# Patient Record
Sex: MALE | Race: WHITE | URBAN - METROPOLITAN AREA
[De-identification: names, ages, dates, MRNs, and addresses within clinical notes are randomized per-mention and may not be internally consistent; named-entity substitution may affect disease eponyms.]

---

## 2017-01-05 ENCOUNTER — HOSPITAL ENCOUNTER (OUTPATIENT)
Facility: CLINIC | Age: 62
Setting detail: OBSERVATION
Discharge: HOME | End: 2017-01-06
Attending: EMERGENCY MEDICINE | Admitting: INTERNAL MEDICINE
Payer: COMMERCIAL

## 2017-01-05 DIAGNOSIS — K92.2 GI BLEED: ICD-10-CM

## 2017-01-05 LAB
ABO + RH BLD: NORMAL
ABO + RH BLD: NORMAL
ANION GAP SERPL CALCULATED.3IONS-SCNC: 7 MMOL/L (ref 3–14)
BASOPHILS # BLD AUTO: 0 10E9/L (ref 0–0.2)
BASOPHILS NFR BLD AUTO: 0.2 %
BLD GP AB SCN SERPL QL: NORMAL
BLOOD BANK CMNT PATIENT-IMP: NORMAL
BUN SERPL-MCNC: 13 MG/DL (ref 7–30)
CALCIUM SERPL-MCNC: 8.1 MG/DL (ref 8.5–10.1)
CHLORIDE SERPL-SCNC: 107 MMOL/L (ref 94–109)
CO2 SERPL-SCNC: 28 MMOL/L (ref 20–32)
CREAT SERPL-MCNC: 0.97 MG/DL (ref 0.66–1.25)
DIFFERENTIAL METHOD BLD: ABNORMAL
EOSINOPHIL # BLD AUTO: 0.1 10E9/L (ref 0–0.7)
EOSINOPHIL NFR BLD AUTO: 1 %
ERYTHROCYTE [DISTWIDTH] IN BLOOD BY AUTOMATED COUNT: 13.1 % (ref 10–15)
GFR SERPL CREATININE-BSD FRML MDRD: 79 ML/MIN/1.7M2
GLUCOSE SERPL-MCNC: 95 MG/DL (ref 70–99)
HCT VFR BLD AUTO: 38.7 % (ref 40–53)
HGB BLD-MCNC: 10.3 G/DL (ref 13.3–17.7)
HGB BLD-MCNC: 10.8 G/DL (ref 13.3–17.7)
HGB BLD-MCNC: 12.2 G/DL (ref 13.3–17.7)
HGB BLD-MCNC: 13.2 G/DL (ref 13.3–17.7)
IMM GRANULOCYTES # BLD: 0 10E9/L (ref 0–0.4)
IMM GRANULOCYTES NFR BLD: 0.2 %
LYMPHOCYTES # BLD AUTO: 1.4 10E9/L (ref 0.8–5.3)
LYMPHOCYTES NFR BLD AUTO: 14.6 %
MCH RBC QN AUTO: 31 PG (ref 26.5–33)
MCHC RBC AUTO-ENTMCNC: 34.1 G/DL (ref 31.5–36.5)
MCV RBC AUTO: 91 FL (ref 78–100)
MONOCYTES # BLD AUTO: 0.6 10E9/L (ref 0–1.3)
MONOCYTES NFR BLD AUTO: 6.2 %
NEUTROPHILS # BLD AUTO: 7.4 10E9/L (ref 1.6–8.3)
NEUTROPHILS NFR BLD AUTO: 77.8 %
NRBC # BLD AUTO: 0 10*3/UL
NRBC BLD AUTO-RTO: 0 /100
PLATELET # BLD AUTO: 260 10E9/L (ref 150–450)
POTASSIUM SERPL-SCNC: 3.5 MMOL/L (ref 3.4–5.3)
RBC # BLD AUTO: 4.26 10E12/L (ref 4.4–5.9)
SODIUM SERPL-SCNC: 142 MMOL/L (ref 133–144)
SPECIMEN EXP DATE BLD: NORMAL
WBC # BLD AUTO: 9.5 10E9/L (ref 4–11)

## 2017-01-05 PROCEDURE — 85018 HEMOGLOBIN: CPT | Performed by: PHYSICIAN ASSISTANT

## 2017-01-05 PROCEDURE — 86900 BLOOD TYPING SEROLOGIC ABO: CPT | Performed by: EMERGENCY MEDICINE

## 2017-01-05 PROCEDURE — 36415 COLL VENOUS BLD VENIPUNCTURE: CPT | Performed by: PHYSICIAN ASSISTANT

## 2017-01-05 PROCEDURE — 80048 BASIC METABOLIC PNL TOTAL CA: CPT | Performed by: EMERGENCY MEDICINE

## 2017-01-05 PROCEDURE — G0378 HOSPITAL OBSERVATION PER HR: HCPCS

## 2017-01-05 PROCEDURE — 96361 HYDRATE IV INFUSION ADD-ON: CPT

## 2017-01-05 PROCEDURE — 25000128 H RX IP 250 OP 636: Performed by: PHYSICIAN ASSISTANT

## 2017-01-05 PROCEDURE — 86850 RBC ANTIBODY SCREEN: CPT | Performed by: EMERGENCY MEDICINE

## 2017-01-05 PROCEDURE — 86901 BLOOD TYPING SEROLOGIC RH(D): CPT | Performed by: EMERGENCY MEDICINE

## 2017-01-05 PROCEDURE — 99285 EMERGENCY DEPT VISIT HI MDM: CPT | Mod: 25

## 2017-01-05 PROCEDURE — 99220 ZZC INITIAL OBSERVATION CARE,LEVL III: CPT | Performed by: PHYSICIAN ASSISTANT

## 2017-01-05 PROCEDURE — 85025 COMPLETE CBC W/AUTO DIFF WBC: CPT | Performed by: EMERGENCY MEDICINE

## 2017-01-05 PROCEDURE — 96360 HYDRATION IV INFUSION INIT: CPT

## 2017-01-05 RX ORDER — PROCHLORPERAZINE MALEATE 5 MG
5-10 TABLET ORAL EVERY 6 HOURS PRN
Status: DISCONTINUED | OUTPATIENT
Start: 2017-01-05 | End: 2017-01-06 | Stop reason: HOSPADM

## 2017-01-05 RX ORDER — LIDOCAINE 40 MG/G
CREAM TOPICAL
Status: DISCONTINUED | OUTPATIENT
Start: 2017-01-05 | End: 2017-01-06 | Stop reason: HOSPADM

## 2017-01-05 RX ORDER — ONDANSETRON 2 MG/ML
4 INJECTION INTRAMUSCULAR; INTRAVENOUS EVERY 6 HOURS PRN
Status: DISCONTINUED | OUTPATIENT
Start: 2017-01-05 | End: 2017-01-06 | Stop reason: HOSPADM

## 2017-01-05 RX ORDER — NALOXONE HYDROCHLORIDE 0.4 MG/ML
.1-.4 INJECTION, SOLUTION INTRAMUSCULAR; INTRAVENOUS; SUBCUTANEOUS
Status: DISCONTINUED | OUTPATIENT
Start: 2017-01-05 | End: 2017-01-06 | Stop reason: HOSPADM

## 2017-01-05 RX ORDER — SODIUM CHLORIDE 9 MG/ML
INJECTION, SOLUTION INTRAVENOUS CONTINUOUS
Status: DISCONTINUED | OUTPATIENT
Start: 2017-01-05 | End: 2017-01-06 | Stop reason: HOSPADM

## 2017-01-05 RX ORDER — ACETAMINOPHEN 325 MG/1
650 TABLET ORAL EVERY 4 HOURS PRN
Status: DISCONTINUED | OUTPATIENT
Start: 2017-01-05 | End: 2017-01-06 | Stop reason: HOSPADM

## 2017-01-05 RX ORDER — ONDANSETRON 4 MG/1
4 TABLET, ORALLY DISINTEGRATING ORAL EVERY 6 HOURS PRN
Status: DISCONTINUED | OUTPATIENT
Start: 2017-01-05 | End: 2017-01-06 | Stop reason: HOSPADM

## 2017-01-05 RX ORDER — ACETAMINOPHEN 650 MG/1
650 SUPPOSITORY RECTAL EVERY 4 HOURS PRN
Status: DISCONTINUED | OUTPATIENT
Start: 2017-01-05 | End: 2017-01-06 | Stop reason: HOSPADM

## 2017-01-05 RX ORDER — PROCHLORPERAZINE 25 MG
25 SUPPOSITORY, RECTAL RECTAL EVERY 12 HOURS PRN
Status: DISCONTINUED | OUTPATIENT
Start: 2017-01-05 | End: 2017-01-06 | Stop reason: HOSPADM

## 2017-01-05 RX ORDER — LIDOCAINE 40 MG/G
CREAM TOPICAL
Status: DISCONTINUED | OUTPATIENT
Start: 2017-01-05 | End: 2017-01-05

## 2017-01-05 RX ADMIN — SODIUM CHLORIDE: 9 INJECTION, SOLUTION INTRAVENOUS at 21:12

## 2017-01-05 ASSESSMENT — ENCOUNTER SYMPTOMS
ANAL BLEEDING: 1
LIGHT-HEADEDNESS: 1
SHORTNESS OF BREATH: 0
DIZZINESS: 0
BLOOD IN STOOL: 1

## 2017-01-05 NOTE — PROGRESS NOTES
List all goals to be met before discharge home:    GI consult: Met    Hemoglobins stable: Partially met. Next hgb check will be at 1800. Pt had large bloody stool.

## 2017-01-05 NOTE — H&P
PRIMARY CARE PHYSICIAN:  [facility].      CHIEF COMPLAINT:  Rectal bleeding.      HISTORY OF PRESENT ILLNESS:  Thomas Islas is a 61-year-old male with past medical history significant for colon polyps and diverticulosis who presented to [facility] Emergency Department with complaints of rectal bleeding.  Approximately 10 days ago, the patient had 1 day of nausea and diarrhea.  This subsequently resolved on its own.  The patient continued to do well until approximately 3 days ago he developed rectal bleeding.  The patient notes that starting on 01/02 he began having episodes of loose watery stools with blood mixed in with them.  They have been increasing in frequency since 01/02.  Today, the patient has had approximately 3 episodes of loose watery stools with blood mixed in them.  Due to further concerns and some lightheadedness, he went into [facility] for further evaluation.  At [facility] patient underwent lab tests and was noted to have a hemoglobin of 11 with a heart rate of 120.  The patient was recommended to come in to [facility] for further evaluation.  In the Emergency Department, the patient was seen and evaluated by Dr. Davis.  Emergency department workup included a BMP with calcium of 8.1 and a CBC with hemoglobin of 13.2.  Heart rate on exam was stable at 72 beats per minute.  Due to persistence of rectal bleeding, GI was contacted from the Emergency Department and they are considering a possible scope.  Hospitalist Service was contacted for admission to observation.        I met with the patient in the Emergency Department.  The patient endorses the above history.  Had some lightheadedness this morning; however, this has since resolved.  The patient denies any cramping or abdominal pain with having bowel movements.  He has been eating and drinking without difficulties.  The patient does take an aspirin daily; however,  he has stopped taking this since 01/02 in light of the rectal bleeding.  The patient last had a colonoscopy in 2014 that  showed colon polyp; however, he underwent a polypectomy at that time.  He also notes that on his colonoscopy, he was noted to have diverticulosis.  The patient has never had rectal bleeding before.      REVIEW OF SYSTEMS:  Complete review of systems was performed and is negative other than as noted in HPI.      PAST MEDICAL HISTORY:  Significant for colon polyps, status post polypectomy and diverticulosis.      PRIOR TO ADMISSION MEDICATIONS:  Aspirin 81 mg by mouth daily.      ALLERGIES:  No known drug allergies.      PAST SURGICAL HISTORY:  None.      FAMILY HISTORY:  The patient's mother has history of breast cancer.  No family history of colon cancer.      SOCIAL HISTORY:  The patient reports rare alcohol use.  Denies tobacco abuse and illicit drug use.      PHYSICAL EXAMINATION:   VITAL SIGNS:  Temperature 98.1, heart rate 95, blood pressure 149/81, respiration rate 16, oxygen saturations 97% on room air.   GENERAL:  Well-appearing male.   HEENT:  Pupils equal and reactive to light.  Mucous membranes moist.   NECK:  No thyromegaly or lymphadenopathy.   CARDIOVASCULAR:  Regular rate and rhythm.  No murmurs.   RESPIRATORY:  Lungs clear to auscultation bilaterally.  No increased work of breathing.   ABDOMEN:  Soft, nontender, nondistended.  Normoactive bowel sounds.   SKIN:  Warm, dry.   EXTREMITIES:  Distal pulses intact in lower extremities bilaterally.  No pedal edema.   NEUROLOGIC:  Oriented x3.   PSYCHIATRIC:  Calm, cooperative.      LABORATORY DATA:  Reviewed in Epic.      ASSESSMENT AND PLAN:  Thomas Islas is a 69-year-old male with past medical history significant for colon polyps, status post polypectomy and diverticulosis who is being admitted to [facility] for rectal bleeding.     Gastrointestinal bleed, likely lower.  Last colonoscopy in 2014 demonstrated  diverticulosis and colon polyp. The patient has had approximately 4 days of rectal bleeding that has been increasing in frequency.  On exam in his primary care clinic, he was noted to have a hemoglobin of 11 with a heart rate of 120.  He was sent to [facility] for further evaluation.  In the Emergency Department, patient's hemoglobin 13.2.  Vital signs are stable.  We will admit patient to observation unit for further monitoring.    --  Continue to trend hemoglobins q.6 h.   --  Patient to be n.p.o. until evaluated by GI.   --  Will consult GI, input graciously appreciated.   --  IV fluids.   --  Repeat CBC tomorrow morning.   --  Conditional orders for blood transfusion if hemoglobin <7.0    Deep venous thrombosis prophylaxis:  Mechanical ambulation.      CODE STATUS:  Full code.      The patient was individually seen and examined by Dr. Beauchamp who agrees with the plan as outlined above.         JULIANNA BEAUCHAMP MD       As dictated by LASHAY PERDUE PA-C            D: 2017 11:48   T: 2017 12:17   MT: [id number]      Name:     SASHA DENNISON   MRN:      [id number]        Account:      [id number]   :      1955           Admitted:     [id number]      Document: [id number]       cc: [facility]

## 2017-01-05 NOTE — ED NOTES
"[facility]  ED Nurse Handoff Report    ED Chief complaint: Rectal Bleeding      ED Diagnosis:   Final diagnoses:   None       Code Status: Full Code    Allergies: No Known Allergies    Activity level:  Independent     Needed?: No    Isolation: No  Infection: Not Applicable    Bariatric?: No      Vital Signs:   Filed Vitals:    01/05/17 0923 01/05/17 0924 01/05/17 0926 01/05/17 1017   BP: 144/93 148/111  149/81   Pulse:  72  95   Temp:  97.6  F (36.4  C) 98.1  F (36.7  C)    TempSrc:  Oral Oral    Resp:  16  16   Height:  1.905 m (6' 3\")     Weight:  123.378 kg (272 lb)     SpO2: 98% 98%  97%       Cardiac Rhythm: ,        Pain level: 0-10 Pain Scale: 0    Is this patient confused?: No    Patient Report: Initial Complaint: Pt started experiencing blood in his stool 3 days ago. Went to clinic today and HgB was 11. Pt also started to feel lightheaded so came to ED.   Focused Assessment: Denies pain at this time.   Tests Performed: Labs  Abnormal Results: Hgb 13.2  Treatments provided: None    Family Comments: None    OBS brochure/video discussed/provided to patient: Yes    ED Medications:   Medications   lidocaine 1 % 1 mL (not administered)   lidocaine (LMX4) cream (not administered)   sodium chloride (PF) 0.9% PF flush 3 mL (not administered)   sodium chloride (PF) 0.9% PF flush 3 mL (not administered)       Drips infusing?:  No      ED NURSE PHONE NUMBER: [phone]           "

## 2017-01-05 NOTE — PHARMACY-ADMISSION MEDICATION HISTORY
Admission medication history interview status for the 1/5/2017  admission is complete. See EPIC admission navigator for prior to admission medications     Medication history source reliability:Good    Actions taken by pharmacist (provider contacted, etc): Interviewed patient.      Additional medication history information not noted on PTA med list :None    Medication reconciliation/reorder completed by provider prior to medication history? No    Time spent in this activity: 5 minutes    Prior to Admission medications    Medication Sig Last Dose Taking? Auth Provider   ASPIRIN PO Take 81 mg by mouth daily 1/2/2017 at am Yes Unknown, Entered By History       Jazz Hummel, FideliaD, BCPS

## 2017-01-05 NOTE — ED PROVIDER NOTES
History     Chief Complaint:  Rectal Bleeding      HPI   Thomas Islas is an otherwise healthy 61 year old male who presents to the emergency department today for evaluation of rectal bleeding and blood in the stool. The patient states that about 10 days ago, he began experiencing diarrhea and then had the stomach flu for about 3 days. However, the patient states that 6 days ago, he did not have a bowel movement so he took a Miralax which helped. The patient states that 3 days ago, he noticed blood in his stool and in the toilet. The patient states that he felt lightheaded this morning for the first time, prompting him to go to his family physician at [facility], where he underwent lab tests and was found to have a normal White blood count but a hemoglobin that dropped to 11.9 alongside a pulse of 120. The patient denies shortness of breath and dizziness. The patient denies a history of abdominal surgeries.     Of note, the patient states that he underwent a colonoscopy in August of 2014 where a polyp was found and was told to return for a repeat colonoscopy in 5 years.       Allergies:  The patient has no known allergies to medications.      Medications:    The patient is not currently taking any prescribed medications.     Past Medical History:    The patient does not have any pertinent past medical history.     Past Surgical History:    The patient has no pertinent surgical history.     Family History:  The patient has no pertinent family history.    Social History:  Marital Status:    PCP: [facility]      Review of Systems   Respiratory: Negative for shortness of breath.    Gastrointestinal: Positive for blood in stool and anal bleeding.   Neurological: Positive for light-headedness. Negative for dizziness.   All other systems reviewed and are negative.    Physical Exam   Vitals:  Patient Vitals for the past 24 hrs:   BP Temp Temp src Pulse Resp SpO2 Height  "Weight   01/05/17 1150 149/81 mmHg - - - - 100 % - -   01/05/17 1147 - - - - - 97 % - -   01/05/17 1017 149/81 mmHg - - 95 16 97 % - -   01/05/17 0926 - 98.1  F (36.7  C) Oral - - - - -   01/05/17 0924 (!) 148/111 mmHg 97.6  F (36.4  C) Oral 72 16 98 % 1.905 m (6' 3\") 123.378 kg (272 lb)   01/05/17 0923 (!) 144/93 mmHg - - - - 98 % - -     Physical Exam  Constitutional: The patient is oriented to person, place, and time.   HENT:   Head: Atraumatic  Right Ear: Normal  Left Ear: Normal  Nose: Nose normal.   Mouth/Throat: Oropharynx is clear and moist. No erythema or exudate.   Eyes: Conjunctivae and EOM are normal. Pupils are equal, round, and reactive to light. No discharge  Neck: Normal range of motion. Neck supple.   Cardiovascular: Normal rate, regular rhythm, no murmur gallops or rubs. Intact distal pulses.    Pulmonary/Chest: CTA bilaterally. No wheezes rale or rhonchi.  Abdominal: Soft. Non tender.  No masses   Musculoskeletal: No edema. No bony deformity. Normal range of motion  Lymphadenopathy: The patient has no cervical adenopathy.   Neurological: The patient is alert and oriented to person, place, and time. The patient has normal strength and normal reflexes. No cranial nerve deficit. Coordination normal.   Skin: Skin is warm and dry. No rash noted. The patient is not diaphoretic.   Psychiatric: The patient has a normal mood and affect.     Emergency Department Course     Laboratory:  Laboratory findings were communicated with the patient who voiced understanding of the findings.    CBC: RBC 4.26 (L), HGB 13.2 (L), HCT 38.7 (L) o/w WNL. (WBC 9.5, )   CMP: Calcium 8.1 (L) o/w WNL (Creatinine: 0.97)  ABO/Rh screen and type: O positive, negative for antibody.        Emergency Department Course:  Nursing notes and vitals reviewed.  I performed an exam of the patient as documented above.   IV was inserted and blood was drawn for laboratory testing, results above.  The patient was rechecked and was " updated on the results of his laboratory studies.   1052: I spoke with Dr. Iraheta of the GI service from [facility] regarding patient's presentation, findings, and plan of care.  1113: I spoke with Dr. Beauchamp of the Hospitalist service from [facility] regarding patient's presentation, findings, and plan of care.  I discussed the treatment plan with the patient. They expressed understanding of this plan and consented to admission. I discussed the patient with Dr. Beauchamp, who will admit the patient to a monitored bed for further evaluation and treatment.  I personally reviewed the laboratory results with the Patient and answered all related questions prior to admission.    Impression & Plan      Medical Decision Making:  Thomas Islas is a 61 year old male who presents with an approximate 1 week history of persistent bright red blood and dark stool from the rectum. Hemoglobin here is 13.2, but per office note from earlier today, it was 11.9 and he was quite tachycardic there. He is borderline tachycardic here. Given the persistent bleeding and significant drop in his hemoglobin from baseline of about 16, I do feel that observation admission is warranted for urgent endoscopy. I spoke with Dr. Beauchamp of the hospitalist service and [facility] of GI. The patient will be admitted for further evaluation and treatment.       Diagnosis:    ICD-10-CM    1. GI bleed K92.2        Disposition:   The patient is admitted to the hospital.       Scribe Disclosure:  Allan MORSE, am serving as a scribe at 9:01 AM on 1/5/2017 to document services personally performed by Sean Davis MD, based on my observations and the provider's statements to me.    1/5/2017    EMERGENCY DEPARTMENT        Sean Davis MD  01/05/17 3088

## 2017-01-05 NOTE — PROGRESS NOTES
List all goals to be met before discharge home:    GI consult: Not met. Still pending.    Hemoglobins stable: Not met.

## 2017-01-05 NOTE — ED AVS SNAPSHOT
MRN:[id number]                      After Visit Summary   1/5/2017    Thomas Islas    MRN: [id number]           Thank you!     Thank you for choosing [facility] for your care. Our goal is always to provide you with excellent care. Hearing back from our patients is one way we can continue to improve our services. Please take a few minutes to complete the written survey that you may receive in the mail after you visit with us. Thank you!        Patient Information     Date Of Birth          1955        About your hospital stay     You were [facility] on:  January 5, 2017 [facility]    You were discharged on:  January 6, 2017        Reason for your hospital stay       Lower gastrointestinal bleeding.                  Who to Call     For medical emergencies, please call 911.  For non-urgent questions about your medical care, please call your primary care provider or clinic, None          Attending Provider     Provider    Sean Davis MD Bhattarai, Nimesh, MD       Primary Care Provider Fax #    [facility] [phone] [address] [place] [address]        After Care Instructions     Activity       Your activity upon discharge: activity as tolerated            Diet       Follow this diet upon discharge: Regular Diet Adult                  Follow-up Appointments     Follow-up and recommended labs and tests        Follow up with primary care provider, [facility], within 7 days for hospital follow- up of lower gi bleed. Repeat cbc.    Contact information if needed for:  [facility] [phone]  Dr. Glenda Hood, physician assistant-certified                  Further instructions from your care team       Follow up with [facility] within 1 week to recheck hemoglobin. Discharge hgb 10.1 (10.1-10.8).    [facility]  "[phone]  Physician: Glenda Iraheta MD  Physician Assistant: Dev Hood    Return to ED if persistent or significant bleeding, fainting, chest pain, abdominal pain with bleeding, other new concerning or worsening symptoms.    Pending Results     No orders found for last 2 day(s).            Statement of Approval     Ordered          17 1448  I have reviewed and agree with all the recommendations and orders detailed in this document.   EFFECTIVE NOW     Approved and electronically signed by:  Barthell, Joanna Kersten Ulmen, PA-C             Admission Information        Provider Department Dept Phone    2017 Junior Beauchamp MD [facility] [phone]      Your Vitals Were     Blood Pressure Pulse Temperature    137/78 mmHg 91 98.5  F (36.9  C) (Oral)    Respirations Height Weight    18 1.905 m (6' 3\") 123.378 kg (272 lb)    BMI (Body Mass Index) Pulse Oximetry       34.00 kg/m2 97%       MyChart Information     [employer] lets you send messages to your doctor, view your test results, renew your prescriptions, schedule appointments and more. To sign up, go to [email or web address] . Click on \"Log in\" on the left side of the screen, which will take you to the Welcome page. Then click on \"Sign up Now\" on the right side of the page.     You will be asked to enter the access code listed below, as well as some personal information. Please follow the directions to create your username and password.     Your access code is: [email or web address]  Expires: 2017  2:41 PM     Your access code will  in 90 days. If you need help or a new code, please call your [facility] clinic or [phone].        Care EveryWhere ID     This is your Care EveryWhere ID. This could be used by other organizations to access your [facility] medical records  [id number]           Review of your medicines      CONTINUE these medicines which have NOT CHANGED        Dose / Directions    ASPIRIN PO        Dose:  81 mg   Take 81 mg " by mouth daily   Refills:  0                Protect others around you: Learn how to safely use, store and throw away your medicines at www.disposemymeds.org.             Medication List: This is a list of all your medications and when to take them. Check marks below indicate your daily home schedule. Keep this list as a reference.      Medications           Morning Afternoon Evening Bedtime As Needed    ASPIRIN PO   Take 81 mg by mouth daily

## 2017-01-05 NOTE — CONSULTS
GASTROENTEROLOGY CONSULTATION     Thomas Islas   [address] [facility] [address]   61 year old male   Admission Date/Time: 1/5/2017   Encounter Date: 1/5/2017  Primary Care Provider: [facility]     Referring / Attending Physician: Anne Marie Sullivan PA-C   We were asked to see the patient in consultation by Anne Marie Sullivan PA-C for evaluation of rectal bleeding.     HPI: Thomas Islas is a 61 year old male without a significant past medical history who presented to the ED today with rectal bleeding. The patient states that he and his wife both had a stomach bug where they had nausea, vomiting and diarrhea that lasted about two days. He was feeling well after this but four days ago he noticed bright red blood mixed in with stool. He denies any abdominal pain, nausea, vomiting or rectal pain. Over the last several days he continued to pass frequent bright red blood with minimal stool output, just blood.  He went to his PCPs this morning because he had some light headedness. At his PCPs his hgb was 11 and his heart rate was noted to be 120. He was referred to the ED.  In the ED he was found to have a hgb of 13.2. Heart rate here was stable at 72. He continued to pass blood so he was admitted for further observation. He denies any family history of IBD or GI malignancy.  His last colonoscopy was performed in October 2015 and a 2 mm polyp was removed which was a tubular adenoma. Diverticulosis was also seen in the sigmoid colon.     Past Medical History  No past medical history on file.    Past Surgical History  No past surgical history on file.    Family History  Negative for IBD or GI malignancy    Social History  Social History     Social History     Marital Status:      Spouse Name: N/A     Number of Children: N/A     Years of Education: N/A     Occupational History     Not on file.     Social History Main Topics     Smoking status: Not on file     Smokeless tobacco:  "Not on file     Alcohol Use: Not on file     Drug Use: Not on file     Sexual Activity: Not on file     Other Topics Concern     Not on file     Social History Narrative     No narrative on file       Medications  Prior to Admission medications    Medication Sig Start Date End Date Taking? Authorizing Provider   ASPIRIN PO Take 81 mg by mouth daily   Yes Unknown, Entered By History       Allergies:  Review of patient's allergies indicates no known allergies.    ROS: A ten point review of systems was obtained and negative other than the symptoms noted above in the HPI.     Physical Exam:   /86 mmHg  Pulse 88  Temp(Src) 97.7  F (36.5  C) (Oral)  Resp 16  Ht 1.905 m (6' 3\")  Wt 123.378 kg (272 lb)  BMI 34.00 kg/m2  SpO2 99%   Constitutional: healthy, alert, no acute distress  Cardiovascular: regular rate and rhythm, no murmurs,rubs or gallops  Respiratory: clear to auscultation bilaterally  Psychiatric: normal pleasant affect  Head: atraumatic, normocephalic  Neck: supple, no thyromegaly  ENT: mucous membranes are moist, no oral lesions are noted  Abdomen: soft, non-tender, non-distended, normally active bowel sound. No masses or hepatosplenomegaly is appreciated. No rebound tenderness or guarding  Neuro: Neurologically intact grossly  Skin: warm, dry, no rashes are noted    ADDITIONAL COMMENTS:   I reviewed the patient's new clinical lab test results.   Recent Labs   Lab Test  01/05/17   1216  01/05/17   0920   WBC   --   9.5   HGB  12.2*  13.2*   MCV   --   91   PLT   --   260      Recent Labs   Lab Test  01/05/17   0920   NA  142   POTASSIUM  3.5   CHLORIDE  107   CO2  28   BUN  13   CR  0.97   ANIONGAP  7   CONNIE  8.1*      No lab results found.   I reviewed the patient's new imaging results.     Assessment:  61 year old male with painless hematochezia for the last four days. He did have what sounds to be a viral GI infection last week but his current bleeding does not sound infectious. This would be an " "atypical presentation for inflammatory bowel disease or ischemic colitis. His symptoms sound most consistent with a diverticular bleed which typically resolve on their own.     Plan:   -Ok to restart diet  -Follow stool output and check for enteric pathogens  -If he starts to bleed profusely would suggest tagged RBC and possible angiography if it is positive  -Would not repeat colonoscopy at this time given the low likelihood of finding a diverticular bleed during that procedure  -Continue supportive care  -GI following    I discussed the patient's findings and plan with Dr. Glenda Iraheta who will also independently see and examine the patient.     Dev Hood PA-C  [facility]  Cell:  [phone] Monday through Friday [phone]  Office: [phone]    Staff addendum: 61 yom with recent viral gastroenteritis with hematochezia since Monday. No abd pain. Last bloody bowel movement a few minutes ago    /86 mmHg  Pulse 88  Temp(Src) 97.7  F (36.5  C) (Oral)  Resp 16  Ht 1.905 m (6' 3\")  Wt 123.378 kg (272 lb)  BMI 34.00 kg/m2  SpO2 99%  Gen: awake alert NAD  Abd: S NT ND +bs    A/P: 61 yom with LGIB likely either due to ischemia or diverticulosis, anticipate bleeding will stop without intervention.  -Follow hgb  -Regular diet ok  -Assess in AM    Glenda Iraheta MD  [facility]  Pager [phone]  Office [phone]    "

## 2017-01-05 NOTE — ED AVS SNAPSHOT
[facility] Observation Unit    [address] [facility] [address] [address]    Phone:  [phone]                                       Thomas Islas   MRN: [id number]    Department:  [facility]   Date of Visit:  1/5/2017           After Visit Summary Signature Page     I have received my discharge instructions, and my questions have been answered. I have discussed any challenges I see with this plan with the nurse or doctor.    ..........................................................................................................................................  Patient/Patient Representative Signature      ..........................................................................................................................................  Patient Representative Print Name and Relationship to Patient    ..................................................               ................................................  Date                                            Time    ..........................................................................................................................................  Reviewed by Signature/Title    ...................................................              ..............................................  Date                                                            Time

## 2017-01-06 VITALS
TEMPERATURE: 98.5 F | OXYGEN SATURATION: 97 % | HEIGHT: 75 IN | WEIGHT: 272 LBS | BODY MASS INDEX: 33.82 KG/M2 | HEART RATE: 91 BPM | DIASTOLIC BLOOD PRESSURE: 78 MMHG | SYSTOLIC BLOOD PRESSURE: 137 MMHG | RESPIRATION RATE: 18 BRPM

## 2017-01-06 LAB
ERYTHROCYTE [DISTWIDTH] IN BLOOD BY AUTOMATED COUNT: 13.4 % (ref 10–15)
HCT VFR BLD AUTO: 31.2 % (ref 40–53)
HGB BLD-MCNC: 10.1 G/DL (ref 13.3–17.7)
HGB BLD-MCNC: 10.5 G/DL (ref 13.3–17.7)
MCH RBC QN AUTO: 30.9 PG (ref 26.5–33)
MCHC RBC AUTO-ENTMCNC: 33.7 G/DL (ref 31.5–36.5)
MCV RBC AUTO: 92 FL (ref 78–100)
PLATELET # BLD AUTO: 197 10E9/L (ref 150–450)
RBC # BLD AUTO: 3.4 10E12/L (ref 4.4–5.9)
WBC # BLD AUTO: 9.5 10E9/L (ref 4–11)

## 2017-01-06 PROCEDURE — 96361 HYDRATE IV INFUSION ADD-ON: CPT

## 2017-01-06 PROCEDURE — 36415 COLL VENOUS BLD VENIPUNCTURE: CPT | Performed by: PHYSICIAN ASSISTANT

## 2017-01-06 PROCEDURE — 25000128 H RX IP 250 OP 636: Performed by: PHYSICIAN ASSISTANT

## 2017-01-06 PROCEDURE — G0378 HOSPITAL OBSERVATION PER HR: HCPCS

## 2017-01-06 PROCEDURE — 99217 ZZC OBSERVATION CARE DISCHARGE: CPT | Performed by: PHYSICIAN ASSISTANT

## 2017-01-06 PROCEDURE — 85027 COMPLETE CBC AUTOMATED: CPT | Performed by: PHYSICIAN ASSISTANT

## 2017-01-06 PROCEDURE — 85018 HEMOGLOBIN: CPT | Performed by: PHYSICIAN ASSISTANT

## 2017-01-06 RX ADMIN — SODIUM CHLORIDE: 9 INJECTION, SOLUTION INTRAVENOUS at 04:24

## 2017-01-06 NOTE — PROGRESS NOTES
List all goals to be met before discharge home:    GI consult: Met, will reassess in AM if condition declines.    Hemoglobins stable: Partially met. Last hg check showed only slight decrease from 10.8 to 10.3. Next check scheduled for 0400, has not yet been drawn at this time.    Nursing to notify MD when goals have been met in preparation for discharge.

## 2017-01-06 NOTE — PROVIDER NOTIFICATION
Per PA, diet changed from regular to NPO. Fluids increased to 125 cc/hour. Nursing to notify MD if SBP<100 or HR>120. Next hg changed to 2200. Writer paging GI to notify.

## 2017-01-06 NOTE — PROGRESS NOTES
List all goals to be met before discharge home:    GI consult: Met, will reassess in AM if condition declines.    Hemoglobins stable: Met    Nursing to notify MD when goals have been met in preparation for discharge.

## 2017-01-06 NOTE — PROGRESS NOTES
List all goals to be met before discharge home:    GI consult: Met    Hemoglobins stable: Not met, hg @ 1800 10.8. Next draw changed to 2200 d/t mild tachycardia/hypotension. Less frequent stool noted, only one episode since 1500. Voided @ 2000 without diarrhea present.    Nursing to notify MD when goals have been met in preparation for discharge.

## 2017-01-06 NOTE — PROVIDER NOTIFICATION
Elevated HR , 110-116 noted with mild hypotension of 100/66. Last hg 10.8, down from 12.8. Only one stool noted since 1500.

## 2017-01-06 NOTE — PROGRESS NOTES
"GASTROENTEROLOGY PROGRESS NOTE     SUBJECTIVE: Feeling well. Denies any pain, nausea or vomiting. Had been tolerating diet yesterday. No symptoms overnight but was tachycardic.      OBJECTIVE:   /78 mmHg  Pulse 91  Temp(Src) 98.5  F (36.9  C) (Oral)  Resp 18  Ht 1.905 m (6' 3\")  Wt 123.378 kg (272 lb)  BMI 34.00 kg/m2  SpO2 97%   Temp (24hrs), Av.3  F (36.8  C), Min:97.7  F (36.5  C), Max:98.7  F (37.1  C)     Patient Vitals for the past 72 hrs:   Weight   17 0924 123.378 kg (272 lb)      Intake/Output Summary (Last 24 hours) at 17 1101  Last data filed at 17 0700   Gross per 24 hour   Intake    560 ml   Output      0 ml   Net    560 ml      PHYSICAL EXAM   Constitutional: Healthy, in bed, no acute distress    Additional Comments:   ROS, FH, SH: See initial GI consult for details.     I have reviewed the patient's new clinical lab results:   Recent Labs   Lab Test  17   0440  17   2155  17   1822   17   0920   WBC  9.5   --    --    --   9.5   HGB  10.5*  10.3*  10.8*   < >  13.2*   MCV  92   --    --    --   91   PLT  197   --    --    --   260    < > = values in this interval not displayed.      Recent Labs   Lab Test  17   0920   NA  142   POTASSIUM  3.5   CHLORIDE  107   CO2  28   BUN  13   CR  0.97   ANIONGAP  7   CONNIE  8.1*      No lab results found.     Assessment:  61 year old male with painless hematochezia. Hgb has dropped about two grams but appears to be stable. No signs of bleeding overnight. Symptoms would be most consistent with a diverticular bleed. Colonoscopy done about two years ago showed one small polyp and diverticulosis.     Plan:    -Diet as tolerated  -Follow stool output  -Recheck hgb this afternoon but would expect it to remain stable  -If hgb stable and no further bleeding he would be ok for discharge from a GI standpoint  -If bleeding returns would suggested tagged RBC scan and possible angiography.   -No further GI " recommendations at this time. Will sign off. Please call with any questions.    Dev Hood PA-C  [facility]  Cell:  [phone] Monday through Friday 7437-0622  Office: [phone]

## 2017-01-06 NOTE — PROGRESS NOTES
[facility]    Hospitalist Progress Note    Date of Service (when I saw the patient): 01/06/2017    Assessment and Plan  Thomas Islas is a 69-year-old male with past medical history significant for colon polyps, status post polypectomy and diverticulosis who is being admitted to [facility] observation unit for rectal bleeding 1/5/2017.     Gastrointestinal bleed, likely lower.  Last colonoscopy in 2014 demonstrated diverticulosis and colon polyp. Presented with approximately 4 days of rectal bleeding and loose stools that has been increasing in frequency.  Initially seen in his primary care clinic with hemoglobin of 11 with a heart rate of 120.  He was sent to [facility] for further evaluation.  In the Emergency Department, patient's hemoglobin 13.2.  Vital signs are stable.  - HGB 13.2-->12.2-->10.8-->10.3-->10.5  - Last bm >12 hr yesterday with a small amount of blood. Developed tachycardia 110s and borderline hypotension shortly after and was made NPO with increased IVF.  - Vitals have stabilized and without further bm or bleeding.  - Consul by GI appreciated; likely diverticular bleed. Consider tagged RBC scan and angiography if bleeding returns.  - Diet advanced  - Recheck hgb this afternoon    Deep venous thrombosis prophylaxis:  Mechanical ambulation.        CODE STATUS:  Full code.      Disposition: Expected discharge this afternoon if diet tolerated, hgb stable, and no significant bleeding.    This patient was discussed with Dr. Monaco of the Hospitalist Service who agrees with current plans as outlined above.    JoAnna K. Barthell, VIDYA    Interval History  No bm or bleeding x 13 hr (1730). Mild tachycardia 110s and  since small amount of bleeding with last bm. No abd pain or nausea. No lightheadedness, cp, difficulty breathing.    -Data reviewed today: I reviewed all new labs and imaging results over the last 24 hours. I personally reviewed no images or EKG's  today.    Physical Exam  Temp: 98.5  F (36.9  C) Temp src: Oral BP: 137/78 mmHg Pulse: 91   Resp: 18 SpO2: 97 % O2 Device: None (Room air)    Filed Vitals:    01/05/17 0924   Weight: 123.378 kg (272 lb)     Vital Signs with Ranges  Temp:  [97.7  F (36.5  C)-98.7  F (37.1  C)] 98.5  F (36.9  C)  Pulse:  [] 91  Resp:  [16-18] 18  BP: (100-149)/(58-86) 137/78 mmHg  SpO2:  [97 %-100 %] 97 %  I/O last 3 completed shifts:  In: 560 [P.O.:560]  Out: -     Constitutional: Alert and oriented x 3. Appears stated age, no acute distress.  Respiratory: Breath sounds CTA. No wheezing, crackles, or rhonchi.  Cardiovascular: RRR, no rub or murmur. No peripheral edema. Dorsalis pedis pulses detected and symmetric.  GI: Soft, non-tender, non-distended. Bowel sounds present.  Skin/Integumen: Warm, dry, no rashes or lesions.    Medications    NaCl 75 mL/hr at 01/06/17 0827       sodium chloride (PF)  3 mL Intracatheter Q8H       Data    Recent Labs  Lab 01/06/17  0440 01/05/17  2155 01/05/17  1822  01/05/17  0920   WBC 9.5  --   --   --  9.5   HGB 10.5* 10.3* 10.8*  < > 13.2*   MCV 92  --   --   --  91     --   --   --  260   NA  --   --   --   --  142   POTASSIUM  --   --   --   --  3.5   CHLORIDE  --   --   --   --  107   CO2  --   --   --   --  28   BUN  --   --   --   --  13   CR  --   --   --   --  0.97   ANIONGAP  --   --   --   --  7   CONNIE  --   --   --   --  8.1*   GLC  --   --   --   --  95   < > = values in this interval not displayed.    Imaging:  No results found for this or any previous visit (from the past 24 hour(s)).

## 2017-01-06 NOTE — PLAN OF CARE
Problem: Goal Outcome Summary  Goal: Goal Outcome Summary  Outcome: Improving  VSS. Waiting for results of 1400 hgb recheck. Tolerated regular diet for lunch. Hopeful for DC home this evening. Up independently. Has has no bloody stools since yesterday at 1730.

## 2017-01-06 NOTE — DISCHARGE SUMMARY
[facility]    Discharge Summary  Hospitalist    Date of Admission:  1/5/2017  Date of Discharge:  1/6/2017  Discharging Provider: JoAnna K. Barthell, PA-C  Date of Service (when I saw the patient): 01/06/2017    Discharge Diagnoses  Likely lower GI bleed thought to be secondary to diverticular bleed    History of Present Illness  Thomas Islas is a 69-year-old male with past medical history significant for colon polyps, status post polypectomy and diverticulosis who is being admitted to [facility] observation unit for rectal bleeding 1/5/2017.     Hospital Course  Thomas Islas was admitted on 1/5/2017.  The following problems were addressed during his hospitalization:    Gastrointestinal bleed, likely lower.  Last colonoscopy in 2014 demonstrated diverticulosis and colon polyp. Presented with approximately 4 days of rectal bleeding and loose stools that has been increasing in frequency.  Initially seen in his primary care clinic with hemoglobin of 11 and heart rate of 120.  He was sent to [facility] for further evaluation.  In the Emergency Department, patient's hemoglobin 13.2 and vital signs were stable.  - HGB 13.2-->12.2-->10.8-->10.3-->10.5-->10.1  - Last bm >20 hours ago and with only a small amount of blood. Shortly after this episode developed tachycardia 110s and borderline hypotension. Subsequently made NPO with increased IVF. Vitals improved and have remained stable throughout stay. No further episodes of bleeding. Tolerating PO. Final hgb 10.1 after 10.5 earlier on day of discharge and suspect some dilutional component related to ongoing maintenance fluids as again no further episodes of visible blood.  - [facility] consult obtained and felt symptoms likely due to diverticular bleed. No further intervention necessary during observation stay.  Follow up:  - [facility]icians, [facility] within 1 week to recheck cbc    This patient was discussed with Dr. Monaco of the  Hospitalist Service who agrees with current plans as outlined above    JoAnna K. Barthell, PA-C    Significant Results and Procedures  As above and below.    Pending Results  Unresulted Labs Ordered in the Past 30 Days of this Admission     No orders found for last 60 day(s).          Code Status  Full Code       Primary Care Physician  [facility]    Physical Exam  Temp: 98.5  F (36.9  C) Temp src: Oral BP: 137/78 mmHg Pulse: 91   Resp: 18 SpO2: 97 % O2 Device: None (Room air)    Filed Vitals:    01/05/17 0924   Weight: 123.378 kg (272 lb)     Vital Signs with Ranges  Temp:  [98.1  F (36.7  C)-98.7  F (37.1  C)] 98.5  F (36.9  C)  Pulse:  [] 91  Resp:  [16-18] 18  BP: (100-137)/(58-78) 137/78 mmHg  SpO2:  [97 %-98 %] 97 %  I/O last 3 completed shifts:  In: 560 [P.O.:560]  Out: -     Constitutional: Appears stated age, no acute distress.  Respiratory: Breath sounds CTA. No wheezing, crackles, or rhonchi.  Cardiovascular: RRR, no rub or murmur. No peripheral edema. Dorsalis pedis pulses detected and symmetric.  GI: Soft, non-tender, non-distended. Bowel sounds present.  Skin/Integumen: Warm, dry, no rashes or lesions.    Discharge Disposition  Discharged to home  Condition at discharge: Stable    Consultations This Hospital Stay  GASTROENTEROLOGY IP CONSULT    Time Spent on This Encounter  I, JoAnna K. Barthell, personally saw the patient today and spent less than or equal to 30 minutes discharging this patient.    Discharge Orders    Reason for your hospital stay   Lower gastrointestinal bleeding.     Activity   Your activity upon discharge: activity as tolerated     Follow-up and recommended labs and tests    Follow up with primary care provider, [facility], within 7 days for hospital follow- up of lower gi bleed. Repeat cbc.    Contact information if needed for:  [facility] [phone]  Dr. Glenda Hood, physician assistant-certified      Full Code     Diet   Follow this diet upon discharge: Regular Diet Adult       Discharge Medications  Current Discharge Medication List      CONTINUE these medications which have NOT CHANGED    Details   ASPIRIN PO Take 81 mg by mouth daily           Allergies  No Known Allergies  Data  Most Recent 3 CBC's:  Recent Labs   Lab Test  01/06/17   1407  01/06/17   0440  01/05/17   2155   01/05/17   0920   WBC   --   9.5   --    --   9.5   HGB  10.1*  10.5*  10.3*   < >  13.2*   MCV   --   92   --    --   91   PLT   --   197   --    --   260    < > = values in this interval not displayed.      Most Recent 3 BMP's:  Recent Labs   Lab Test  01/05/17   0920   NA  142   POTASSIUM  3.5   CHLORIDE  107   CO2  28   BUN  13   CR  0.97   ANIONGAP  7   CONNIE  8.1*   GLC  95     Most Recent 2 LFT's:No lab results found.  Most Recent INR's and Anticoagulation Dosing History:        Anticoagulation Dose History     There is no flowsheet data to display.        Most Recent 3 Troponin's:No lab results found.  Most Recent Cholesterol Panel:No lab results found.  Most Recent 6 Bacteria Isolates From Any Culture (See EPIC Reports for Culture Details):No lab results found.  Most Recent TSH, T4 and A1c Labs:No lab results found.  No results found for this or any previous visit.

## 2017-01-06 NOTE — DISCHARGE INSTRUCTIONS
Follow up with [facility] within 1 week to recheck hemoglobin. Discharge hgb 10.1 (10.1-10.8).    [facility] [phone]  Physician: Gledna Iraheta MD  Physician Assistant: Dev Hood    Return to ED if persistent or significant bleeding, fainting, chest pain, abdominal pain with bleeding, other new concerning or worsening symptoms.

## 2017-01-06 NOTE — PLAN OF CARE
Problem: Goal Outcome Summary  Goal: Goal Outcome Summary  Outcome: No Change  RN: Alert and oriented X 3, up Ind without assistive device. One episode of loose stool noted X 1 with clots, dried blood. Hg decreased from 12.2 to 10.8 @ 1800 with tachycardia and mild hypotension noted. Fluids increased, pt made NPO for possible scope in AM. VS then improved; HR 's with 's-120's. Last hg check @ 2200; slight decrease to 10.3. No episodes of loose stool/melena since 1730. Pt denies any associated dizziness or lightheadedness. Previously tolerating regular diet without nausea and voiding adequately. GI notified of episode, to reassess in AM if sx's persist. Pt otherwise stable and improved at this time.

## 2017-01-06 NOTE — PLAN OF CARE
Problem: Goal Outcome Summary  Goal: Goal Outcome Summary  Outcome: No Change  RN: Alert and oriented X 3, up Ind without assistive device. Last hg check improved from 10.3 to 10.5. Last stool yesterday evening @ 1730. Denies pain, abd cramping. BS present and normoactive. VS improved, BP stable in 110-130's, HR in high 80's-low 90's overnight. Pt NPO d/t decline in condition yesterday evening, but previously tolerating regular diet well without nausea. Anticipate discharge today if condition continues to improve.

## 2017-01-06 NOTE — PROGRESS NOTES
List all goals to be met before discharge home:    GI consult: Met, will reassess in AM if condition declines.    Hemoglobins stable: Partially met. Last hg check showed only slight decrease from 10.8 to 10.3. Next check scheduled for 0400.    Nursing to notify MD when goals have been met in preparation for discharge.

## 2017-01-06 NOTE — PROGRESS NOTES
List all goals to be met before discharge home:    GI consult: Met, will reassess in AM if condition declines.    Hemoglobins stable: Met - will recheck at 1400    Nursing to notify MD when goals have been met in preparation for discharge.

## 2017-01-06 NOTE — PROVIDER NOTIFICATION
GI notified of condition, agreeable to changes made by PA with no new orders. Continue to monitor.

## 2017-01-06 NOTE — PLAN OF CARE
Problem: Goal Outcome Summary  Goal: Goal Outcome Summary  Outcome: Adequate for Discharge Date Met:  01/06/17  VSS. A/O. Hgb stable 10.1 on last check. DC instructions reviewed with patient and wife. All questions answered. Patient verbalizes understanding of DC care.

## 2017-01-06 NOTE — PROGRESS NOTES
List all goals to be met before discharge home:    GI consult: Met    Hemoglobins stable: Partially met. Next hgb check will be at 1800. Pt had large bloody stool this afternoon, will continue to monitor.    Nursing to notify MD when goals have been met in preparation for discharge.

## 2021-11-02 ENCOUNTER — HOSPITAL ENCOUNTER (EMERGENCY)
Facility: CLINIC | Age: 66
Discharge: HOME | End: 2021-11-02
Attending: EMERGENCY MEDICINE | Admitting: EMERGENCY MEDICINE

## 2021-11-02 ENCOUNTER — APPOINTMENT (OUTPATIENT)
Dept: GENERAL RADIOLOGY | Facility: CLINIC | Age: 66
End: 2021-11-02
Attending: EMERGENCY MEDICINE

## 2021-11-02 VITALS
RESPIRATION RATE: 18 BRPM | SYSTOLIC BLOOD PRESSURE: 122 MMHG | HEIGHT: 76 IN | BODY MASS INDEX: 33.11 KG/M2 | OXYGEN SATURATION: 97 % | DIASTOLIC BLOOD PRESSURE: 82 MMHG | HEART RATE: 98 BPM | TEMPERATURE: 97.8 F

## 2021-11-02 DIAGNOSIS — K59.00 CONSTIPATION, UNSPECIFIED CONSTIPATION TYPE: ICD-10-CM

## 2021-11-02 PROCEDURE — 99283 EMERGENCY DEPT VISIT LOW MDM: CPT | Mod: 25

## 2021-11-02 PROCEDURE — 74019 RADEX ABDOMEN 2 VIEWS: CPT

## 2021-11-02 ASSESSMENT — ENCOUNTER SYMPTOMS: CONSTIPATION: 1

## 2021-11-02 NOTE — ED TRIAGE NOTES
"Pt has a hx of constipation when traveling.  \"I have stool but not much, wehnt to my PA last Thursday, been doubling my stool softeners with little relief.\"  "

## 2021-11-02 NOTE — ED PROVIDER NOTES
"  History   Chief Complaint:  Constipation       The history is provided by the patient.      Thomas Islas is a 66 year old male with a history of hypertension and GI bleed who presents with constipation. Patient reports having a history of constipation when traveling and he recently returned from visiting his daughter internationally for 2 weeks, he took 3 flights in each direction totaling ~20 hours of travel. He was producing a much smaller amount of stool than normal during his trip despite taking Metamucil, reports that last normal BM was on 10/9. He tried adding Dulcolax when he returned but continued to have constipation, he went in to see his PA on 10/28 and X-ray reportedly identified fecal impaction. His Miralax was doubled at that time, he was also prescribed Senna and daily enema but has had little improvement with this. Has not tried any magnesium citrate. No other concerns to report.    Review of Systems   Gastrointestinal: Positive for constipation.   All other systems reviewed and are negative.    Allergies:  No Known Drug Allergies    Medications:  Aspirin 81 mg  Miralax  Senna  Dulcolax    Past Medical History:    Hypertension  GI bleed    Social History:  The patient was not accompanied to the ED.  PCP: [facility]  Marital status:     Physical Exam     Patient Vitals for the past 24 hrs:   BP Temp Temp src Pulse Resp SpO2 Height   11/02/21 0834 122/82 97.8  F (36.6  C) Temporal 98 18 97 % 1.93 m (6' 4\")       Physical Exam  General: Resting comfortably on the gurney  Head:  The scalp, face, and head appear normal  Eyes:  The pupils are equal, round, and reactive to light    There is no nystagmus    Extraocular muscles are intact    Conjunctivae and sclerae are normal  ENT:    The nose is normal    Pinnae are normal  Neck:  Normal range of motion  GI:  Abdomen is soft, there is no rigidity    No distension    Trace tympani    No rebound tenderness     Non-surgical " without peritoneal features  Rectal: Prostate is normal. There is no fecal impaction. No stool noted in the rectal vault at this time.  MS:  No asymmetric leg swelling, no calf tenderness  Skin:  No rash or acute skin lesions noted  Neuro: Speech is normal and fluent  Psych:  Awake. Alert.      Normal affect.  Appropriate interactions.    Emergency Department Course     Imaging:  Abdomen XR, 2 vw, flat and upright:  Bowel gas pattern is nonobstructed. Moderate amount of stool throughout the colon. No free intraperitoneal air.  Report per radiology.    Emergency Department Course:    Reviewed:  I reviewed the patient's nursing notes, vitals and past medical history.     Assessments:  0917 I performed an exam of the patient in room 24 as documented above.  0957 Patient rechecked and updated on findings.     Disposition:  The patient was discharged to home.     Impression & Plan       Medical Decision Making:  Thomas Islas is a 66 year old male who presents with symptoms concerning for constipation.  The patient has had no vomiting.  He has been using enemas and does not have a painful perineal symptoms suggestive of definitive rectal fecal impaction.  Rectal exam reveals an empty rectum.  Abdominal radiography indicates stool and air accumulating throughout the colon without evidence of bowel obstruction or perforation.  The patient will be given a bowel prep from above at this point to help clean out the system.  He was also given advice regarding the use of probiotics and natural fruits, vegetables, and fiber-containing foods to help with bowel transit.  No life-threatening etiologies are detected.    Diagnosis:    ICD-10-CM    1. Constipation, unspecified constipation type  K59.00        Discharge Medications:   New Prescriptions    POLYETHYLENE GLYCOL (GOLYTELY) 236 G SUSPENSION    Take 4,000 mLs by mouth once for 1 dose       Scribe Disclosure:  Jailyn MORSE, am serving as a scribe at 9:17 AM on  11/2/2021 to document services personally performed by Paco Arrington MD based on my observations and the provider's statements to me.           Paco Arrington MD  11/02/21 1521

## 2022-08-18 ENCOUNTER — TELEPHONE (OUTPATIENT)
Dept: PHARMACY | Facility: PHYSICIAN GROUP | Age: 67
End: 2022-08-18

## 2022-08-18 DIAGNOSIS — U07.1 INFECTION DUE TO 2019 NOVEL CORONAVIRUS: Primary | ICD-10-CM

## 2022-08-18 NOTE — TELEPHONE ENCOUNTER
Patient of [facility] tested positive for COVID19 on 8/17 with symptom onset on 8/16. Had virtual visit with Rosalia Ortiz PA-C on 8/17 and recommended treatment with monoclonal antibodies based on patient factors.      Order placed under verbal authorization from Rosalia Ortiz PA-C.  [facility] will be reaching out to the patient to set up treatment within the 7 day treatment window.     Kaitlin Gonzalez, Pharm.D, [facility]  Medication Therapy Management Pharmacist  [phone]

## 2022-08-19 ENCOUNTER — HOME INFUSION (PRE-WILLOW HOME INFUSION) (OUTPATIENT)
Dept: PHARMACY | Facility: CLINIC | Age: 67
End: 2022-08-19

## 2022-08-22 ENCOUNTER — HOME INFUSION (PRE-WILLOW HOME INFUSION) (OUTPATIENT)
Dept: PHARMACY | Facility: CLINIC | Age: 67
End: 2022-08-22

## 2022-08-24 NOTE — PROGRESS NOTES
This is a recent snapshot of the patient's [facility] medical record.  For current drug dose and complete information and questions, call [phone] or In Basket pool, [facility] ([id number])  CSN Number:  [id number]

## 2022-09-02 NOTE — PROGRESS NOTES
This is a recent snapshot of the patient's [facility] medical record.  For current drug dose and complete information and questions, call [phone] or In Basket pool, [facility] ([id number])  CSN Number:  [id number]